# Patient Record
Sex: FEMALE
[De-identification: names, ages, dates, MRNs, and addresses within clinical notes are randomized per-mention and may not be internally consistent; named-entity substitution may affect disease eponyms.]

---

## 2018-11-07 ENCOUNTER — HOSPITAL ENCOUNTER (EMERGENCY)
Dept: HOSPITAL 87 - ER | Age: 33
Discharge: LEFT BEFORE DISCHARGE | End: 2018-11-07
Payer: SELF-PAY

## 2018-11-07 VITALS — DIASTOLIC BLOOD PRESSURE: 65 MMHG | SYSTOLIC BLOOD PRESSURE: 115 MMHG

## 2018-11-07 VITALS — HEIGHT: 64 IN | BODY MASS INDEX: 22.21 KG/M2 | WEIGHT: 130.07 LBS

## 2018-11-07 DIAGNOSIS — Z53.21: Primary | ICD-10-CM

## 2020-12-18 ENCOUNTER — HOSPITAL ENCOUNTER (EMERGENCY)
Dept: HOSPITAL 72 - EMR | Age: 35
Discharge: HOME | End: 2020-12-18
Payer: COMMERCIAL

## 2020-12-18 VITALS — SYSTOLIC BLOOD PRESSURE: 159 MMHG | DIASTOLIC BLOOD PRESSURE: 103 MMHG

## 2020-12-18 VITALS — HEIGHT: 64 IN | BODY MASS INDEX: 23.05 KG/M2 | WEIGHT: 135 LBS

## 2020-12-18 VITALS — DIASTOLIC BLOOD PRESSURE: 103 MMHG | SYSTOLIC BLOOD PRESSURE: 159 MMHG

## 2020-12-18 DIAGNOSIS — T40.411A: Primary | ICD-10-CM
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
## 2020-12-18 DIAGNOSIS — R45.1: ICD-10-CM

## 2020-12-18 LAB
ADD MANUAL DIFF: NO
ALBUMIN SERPL-MCNC: 4.2 G/DL (ref 3.4–5)
ALBUMIN/GLOB SERPL: 1.1 {RATIO} (ref 1–2.7)
ALP SERPL-CCNC: 89 U/L (ref 46–116)
ALT SERPL-CCNC: 25 U/L (ref 12–78)
ANION GAP SERPL CALC-SCNC: 13 MMOL/L (ref 5–15)
AST SERPL-CCNC: 33 U/L (ref 15–37)
BASOPHILS NFR BLD AUTO: 1.5 % (ref 0–2)
BILIRUB SERPL-MCNC: 0.7 MG/DL (ref 0.2–1)
BUN SERPL-MCNC: 15 MG/DL (ref 7–18)
CALCIUM SERPL-MCNC: 8.9 MG/DL (ref 8.5–10.1)
CHLORIDE SERPL-SCNC: 99 MMOL/L (ref 98–107)
CO2 SERPL-SCNC: 23 MMOL/L (ref 21–32)
CREAT SERPL-MCNC: 1 MG/DL (ref 0.55–1.3)
EOSINOPHIL NFR BLD AUTO: 0.1 % (ref 0–3)
ERYTHROCYTE [DISTWIDTH] IN BLOOD BY AUTOMATED COUNT: 12.8 % (ref 11.6–14.8)
GLOBULIN SER-MCNC: 3.7 G/DL
HCT VFR BLD CALC: 40.2 % (ref 37–47)
HGB BLD-MCNC: 14.2 G/DL (ref 12–16)
LYMPHOCYTES NFR BLD AUTO: 44.2 % (ref 20–45)
MCV RBC AUTO: 90 FL (ref 80–99)
MONOCYTES NFR BLD AUTO: 6.6 % (ref 1–10)
NEUTROPHILS NFR BLD AUTO: 47.6 % (ref 45–75)
PLATELET # BLD: 239 K/UL (ref 150–450)
POTASSIUM SERPL-SCNC: 4.7 MMOL/L (ref 3.5–5.1)
RBC # BLD AUTO: 4.47 M/UL (ref 4.2–5.4)
SODIUM SERPL-SCNC: 135 MMOL/L (ref 136–145)
WBC # BLD AUTO: 5.3 K/UL (ref 4.8–10.8)

## 2020-12-18 PROCEDURE — 96360 HYDRATION IV INFUSION INIT: CPT

## 2020-12-18 PROCEDURE — 85025 COMPLETE CBC W/AUTO DIFF WBC: CPT

## 2020-12-18 PROCEDURE — 36415 COLL VENOUS BLD VENIPUNCTURE: CPT

## 2020-12-18 PROCEDURE — 80053 COMPREHEN METABOLIC PANEL: CPT

## 2020-12-18 PROCEDURE — 96372 THER/PROPH/DIAG INJ SC/IM: CPT

## 2020-12-18 PROCEDURE — 99284 EMERGENCY DEPT VISIT MOD MDM: CPT

## 2020-12-18 NOTE — NUR
ED Nurse Note:



PATIENT WAS BROUGHT IN BY RA 29 FROM HOME. PER EMS, PT.'S BF CALLED 911 BECAUSE 
SHE TOOK UNK DOSE OF FENTANYL. PER EMS, PT. HAD A 1 DOSE OF NARCAN 
INTRANASALLY. PATIENT PRESENTED AWAKE, AAO X0, WITH PINPOINT PUPLS,  SCREAMING 
OUT LOUD, KIKING, SENTENCES DO NOT MAKE SENCE. IV WAS ESTABLISHED ON RIGHT UPER 
ARM 22GA, BLOOD COLLECTED SENT TO LAB

## 2020-12-18 NOTE — EMERGENCY ROOM REPORT
History of Present Illness


Present Illness


HPI


Disclaimer: Please note that this report is being documented using [employer]AG[employer] 

technology. This can lead to erroneous entry secondary to incorrect 

interpretation by the dictating instrument.





HPI: 35-year-old female, unknown history, presents from home by EMS secondary to

apparent fentanyl overdose.  EMS was called by boyfriend.  He reported that she 

is fentanyl.  She was found unresponsive.  Given Narcan in route to ER, and on 

arrival patient awake and agitated and uncooperative


Allergies:  


Coded Allergies:  


     No Known Allergies (Unverified , 12/18/20)





Patient History


Reviewed Nursing Documentation:  PMH: Agreed; PSxH: Agreed





Review of Systems


All Other Systems:  negative except mentioned in HPI





Physical Exam


Sp02 EP Interpretation:  reviewed, normal


General Appearance:  well appearing, other - Patient agitated


Head:  normocephalic, atraumatic


Eyes:  bilateral eye PERRL, bilateral eye EOMI


ENT:  hearing grossly normal, moist mucus membranes


Neck:  full range of motion, supple


Respiratory:  lungs clear, normal breath sounds, no rhonchi, no respiratory d

istress, no retraction, no wheezing


Cardiovascular #1:  normal peripheral pulses, no murmur, tachycardia


Gastrointestinal:  non tender, soft, non-distended, no guarding


Neurologic:  alert, no focal defects, other - Patient yelling, uncooperative, 

does not answer questions


Psychiatric:  other - Patient agitated


Skin:  normal color, warm/dry





Medical Decision Making


ER Course


MDM: Patient presented agitated after an alleged fentanyl overdose.  Patient did

receive Narcan prior to arrival.  Unfortunately patient was extremely 

uncooperative requiring restraints and Haldol was given.  Differential included 

but not limited to polysubstance abuse, overdose, agitated delirium to name a 

few





Clinical course-after 1 hour of observation patient was alert, oriented, no 

acute distress, and requested to be discharged.  She was discharged in the care 

of her friends.  She had no recurrent respiratory depression in the ER.  Vital 

signs stabilized.





Labs - 


Laboratory Tests








Test


 12/18/20


17:29


 


White Blood Count


 5.3 K/UL


(4.8-10.8)


 


Red Blood Count


 4.47 M/UL


(4.20-5.40)


 


Hemoglobin


 14.2 G/DL


(12.0-16.0)


 


Hematocrit


 40.2 %


(37.0-47.0)


 


Mean Corpuscular Volume 90 FL (80-99)  


 


Mean Corpuscular Hemoglobin


 31.9 PG


(27.0-31.0)  H


 


Mean Corpuscular Hemoglobin


Concent 35.4 G/DL


(32.0-36.0)


 


Red Cell Distribution Width


 12.8 %


(11.6-14.8)


 


Platelet Count


 239 K/UL


(150-450)


 


Mean Platelet Volume


 6.1 FL


(6.5-10.1)  L


 


Neutrophils (%) (Auto)


 47.6 %


(45.0-75.0)


 


Lymphocytes (%) (Auto)


 44.2 %


(20.0-45.0)


 


Monocytes (%) (Auto)


 6.6 %


(1.0-10.0)


 


Eosinophils (%) (Auto)


 0.1 %


(0.0-3.0)


 


Basophils (%) (Auto)


 1.5 %


(0.0-2.0)


 


Sodium Level


 135 MMOL/L


(136-145)  L


 


Potassium Level


 4.7 MMOL/L


(3.5-5.1)


 


Chloride Level


 99 MMOL/L


()


 


Carbon Dioxide Level


 23 MMOL/L


(21-32)


 


Anion Gap


 13 mmol/L


(5-15)


 


Blood Urea Nitrogen


 15 mg/dL


(7-18)


 


Creatinine


 1.0 MG/DL


(0.55-1.30)


 


Estimated Glomerular


Filtration Rate > 60 mL/min


(>60)


 


Glucose Level


 205 MG/DL


()  H


 


Calcium Level


 8.9 MG/DL


(8.5-10.1)


 


Total Bilirubin


 0.7 MG/DL


(0.2-1.0)


 


Aspartate Amino Transferase


(AST) 33 U/L (15-37)





 


Alanine Aminotransferase (ALT)


 25 U/L (12-78)





 


Alkaline Phosphatase


 89 U/L


()


 


Total Protein


 7.9 G/DL


(6.4-8.2)


 


Albumin


 4.2 G/DL


(3.4-5.0)


 


Globulin 3.7 g/dL  


 


Albumin/Globulin Ratio 1.1 (1.0-2.7)  











On reevaluation: Patient alert, oriented, no acute distress dilatory without 

assistance








Plan-discharged home.  Discussed cessation of illicit drugs.


Disposition:  HOME, SELF-CARE


Condition:  Improved











Javi Cruz M.D.            Dec 18, 2020 17:19